# Patient Record
Sex: MALE | Race: BLACK OR AFRICAN AMERICAN | NOT HISPANIC OR LATINO | Employment: FULL TIME | URBAN - METROPOLITAN AREA
[De-identification: names, ages, dates, MRNs, and addresses within clinical notes are randomized per-mention and may not be internally consistent; named-entity substitution may affect disease eponyms.]

---

## 2017-01-06 ENCOUNTER — HOSPITAL ENCOUNTER (EMERGENCY)
Facility: HOSPITAL | Age: 32
Discharge: LEFT BEFORE DISCHARGE | End: 2017-01-06
Attending: EMERGENCY MEDICINE
Payer: MEDICAID

## 2017-01-06 VITALS
HEART RATE: 110 BPM | TEMPERATURE: 97 F | DIASTOLIC BLOOD PRESSURE: 110 MMHG | RESPIRATION RATE: 20 BRPM | OXYGEN SATURATION: 95 % | BODY MASS INDEX: 39.2 KG/M2 | WEIGHT: 280 LBS | SYSTOLIC BLOOD PRESSURE: 199 MMHG | HEIGHT: 71 IN

## 2017-01-06 DIAGNOSIS — R05.9 COUGH: Primary | ICD-10-CM

## 2017-01-06 DIAGNOSIS — Z86.79 HISTORY OF HYPERTENSION: ICD-10-CM

## 2017-01-06 PROCEDURE — 99282 EMERGENCY DEPT VISIT SF MDM: CPT

## 2017-01-06 NOTE — ED PROVIDER NOTES
SCRIBE #1 NOTE: I, Barbi Vazquez, am scribing for, and in the presence of, Barbi Vazquez MD. I have scribed the entire note.      History      Chief Complaint   Patient presents with    Cough     with congestion and runny nose since yesterday       Review of patient's allergies indicates:  No Known Allergies     HPI   HPI    1/6/2017, 10:45 AM   History obtained from the patient      History of Present Illness: Marty Burnett is a 31 y.o. male patient who presents to the Emergency Department for cough which onset gradually yesterday. Symptoms are constant and moderate in severity. No mitigating or exacerbating factors reported. Associated sxs include congestion. Patient denies any fever, n/v/d, sore throat, SOB, CP, abd pain and all other sxs at this time. Pt reports a history of hypertension and takes daily medication for it. No prior Tx reported. No further complaints or concerns at this time.         Arrival mode: Personal vehicle     PCP: BEAU Win       Past Medical History:  Past Medical History   Diagnosis Date    Hypertension        Past Surgical History:  Past Surgical History   Procedure Laterality Date    Right wrist           Family History:  Family History   Problem Relation Age of Onset    No Known Problems Mother     No Known Problems Father        Social History:  Social History     Social History Main Topics    Smoking status: Never Smoker    Smokeless tobacco: Unknown    Alcohol use No    Drug use: No    Sexual activity: Unkown       ROS   Review of Systems   Constitutional: Negative for fever.   HENT: Positive for congestion. Negative for rhinorrhea and sore throat.    Respiratory: Positive for cough. Negative for shortness of breath.    Cardiovascular: Negative for chest pain.   Gastrointestinal: Negative for abdominal pain, diarrhea, nausea and vomiting.   Genitourinary: Negative for dysuria.   Musculoskeletal: Negative for back pain.   Skin: Negative for  "rash.   Neurological: Negative for dizziness and weakness.   Hematological: Does not bruise/bleed easily.   All other systems reviewed and are negative.      Physical Exam    Initial Vitals   BP Pulse Resp Temp SpO2   01/06/17 1020 01/06/17 1020 01/06/17 1020 01/06/17 1020 01/06/17 1020   199/110 110 20 97 °F (36.1 °C) 95 %      Physical Exam  Nursing Notes and Vital Signs Reviewed.  Constitutional: Patient is in no acute distress. Awake and alert. Well-developed and well-nourished.  Head: Atraumatic. Normocephalic.  Eyes: PERRL. EOM intact. Conjunctivae are not pale. No scleral icterus.  ENT: Mucous membranes are moist. Oropharynx is clear and symmetric.    Neck: Supple. Full ROM. No lymphadenopathy.  Cardiovascular: Tachycardic. Regular rhythm. No murmurs, rubs, or gallops. Distal pulses are 2+ and symmetric.  Pulmonary/Chest: No respiratory distress. Diminished breath sounds. No wheezing, rales, or rhonchi.  Abdominal: Soft and non-distended.  There is no tenderness.  No rebound, guarding, or rigidity.   Musculoskeletal: Moves all extremities. No obvious deformities. No edema. No calf tenderness.  Skin: Warm and dry.  Neurological:  Alert, awake, and appropriate.  Normal speech.  No acute focal neurological deficits are appreciated.  Psychiatric: Normal affect. Good eye contact. Appropriate in content.    ED Course    Procedures  ED Vital Signs:  Vitals:    01/06/17 1020   BP: (S) (!) 199/110   Pulse: 110   Resp: 20   Temp: 97 °F (36.1 °C)   TempSrc: Tympanic   SpO2: 95%   Weight: 127 kg (280 lb)   Height: 5' 11" (1.803 m)            The Emergency Provider reviewed the vital signs and test results, which are outlined above.    ED Discussion     10:55 AM: Pt was offered a chest x-ray and further evaluation of hypertension. Pt states, "he's really only at the ED with his cousin who was in an MVC". Pt also states, " He's allergic to ibuprofen, it makes his stomach hurt, and he can buy his own tylenol". Pt eloped " from the ED.      ED Medication(s):  Medications - No data to display    New Prescriptions    No medications on file             Medical Decision Making              Scribe Attestation:   Scribe #1: I performed the above scribed service and the documentation accurately describes the services I performed. I attest to the accuracy of the note.    Attending:   Physician Attestation Statement for Scribe #1: I, Barbi Vazquez MD, personally performed the services described in this documentation, as scribed by Daja Estrada, in my presence, and it is both accurate and complete.          Clinical Impression       ICD-10-CM ICD-9-CM   1. Cough R05 786.2   2. Elevated blood pressure I10 401.9   3. History of hypertension Z86.79 V12.59       Disposition:   Disposition: Patrick Vazquez MD  01/06/17 1129

## 2017-01-06 NOTE — ED NOTES
Pt examined by DR SARABJIT Vazquez  without RN, educated on prescriptions, given discharge instructions and discharged to [facility]. See provider notes for exam.